# Patient Record
Sex: MALE | Race: WHITE | Employment: FULL TIME | URBAN - METROPOLITAN AREA
[De-identification: names, ages, dates, MRNs, and addresses within clinical notes are randomized per-mention and may not be internally consistent; named-entity substitution may affect disease eponyms.]

---

## 2023-08-16 ENCOUNTER — OFFICE VISIT (OUTPATIENT)
Dept: URGENT CARE | Age: 48
End: 2023-08-16

## 2023-08-16 VITALS
BODY MASS INDEX: 30.06 KG/M2 | OXYGEN SATURATION: 98 % | HEART RATE: 74 BPM | WEIGHT: 210 LBS | SYSTOLIC BLOOD PRESSURE: 130 MMHG | RESPIRATION RATE: 12 BRPM | DIASTOLIC BLOOD PRESSURE: 89 MMHG | HEIGHT: 70 IN | TEMPERATURE: 97.8 F

## 2023-08-16 DIAGNOSIS — L03.116 CELLULITIS OF LEFT LEG: Primary | ICD-10-CM

## 2023-08-16 DIAGNOSIS — L23.9 ALLERGIC CONTACT DERMATITIS, UNSPECIFIED TRIGGER: ICD-10-CM

## 2023-08-16 LAB
ALBUMIN SERPL-MCNC: 4.6 G/DL (ref 3.4–5)
ALBUMIN/GLOB SERPL: 1.8 {RATIO} (ref 1.1–2.2)
ALP SERPL-CCNC: 73 U/L (ref 40–129)
ALT SERPL-CCNC: 28 U/L (ref 10–40)
ANION GAP SERPL CALCULATED.3IONS-SCNC: 10 MMOL/L (ref 3–16)
AST SERPL-CCNC: 28 U/L (ref 15–37)
BILIRUB SERPL-MCNC: 0.4 MG/DL (ref 0–1)
BUN SERPL-MCNC: 14 MG/DL (ref 7–20)
CALCIUM SERPL-MCNC: 9.4 MG/DL (ref 8.3–10.6)
CHLORIDE SERPL-SCNC: 102 MMOL/L (ref 99–110)
CO2 SERPL-SCNC: 26 MMOL/L (ref 21–32)
CREAT SERPL-MCNC: 0.8 MG/DL (ref 0.9–1.3)
GFR SERPLBLD CREATININE-BSD FMLA CKD-EPI: >60 ML/MIN/{1.73_M2}
GLUCOSE SERPL-MCNC: 97 MG/DL (ref 70–99)
POTASSIUM SERPL-SCNC: 4.2 MMOL/L (ref 3.5–5.1)
PROT SERPL-MCNC: 7.1 G/DL (ref 6.4–8.2)
SODIUM SERPL-SCNC: 138 MMOL/L (ref 136–145)

## 2023-08-16 RX ORDER — METHYLPREDNISOLONE 4 MG/1
TABLET ORAL
Qty: 1 KIT | Refills: 0 | Status: SHIPPED | OUTPATIENT
Start: 2023-08-16 | End: 2023-08-22

## 2023-08-16 RX ORDER — SULFAMETHOXAZOLE AND TRIMETHOPRIM 800; 160 MG/1; MG/1
1 TABLET ORAL 2 TIMES DAILY
Qty: 14 TABLET | Refills: 0 | Status: SHIPPED | OUTPATIENT
Start: 2023-08-16 | End: 2023-08-23

## 2023-08-16 RX ORDER — CETIRIZINE HYDROCHLORIDE 10 MG/1
10 TABLET ORAL DAILY
COMMUNITY
Start: 2023-08-16

## 2023-08-16 NOTE — PATIENT INSTRUCTIONS
Complete antibiotics as prescribed. An over the counter probiotic is also recommended. Complete steroids as prescribed  Wound culture, CBC, CMP, and Hgb A1C sent. Will call with results. Cleanse legs with antibiotic soap and avoid irritants  Recommend Zyrtec 10mg daily   Follow up with PCP this week   ER evaluation if symptoms persist or if symptoms worsen. New Prescriptions    CETIRIZINE (ZYRTEC) 10 MG TABLET    Take 1 tablet by mouth daily    METHYLPREDNISOLONE (MEDROL DOSEPACK) 4 MG TABLET    Take by mouth.     SULFAMETHOXAZOLE-TRIMETHOPRIM (BACTRIM DS;SEPTRA DS) 800-160 MG PER TABLET    Take 1 tablet by mouth 2 times daily for 7 days

## 2023-08-16 NOTE — PROGRESS NOTES
Sherly Garcia (:  1975) is a 50 y.o. male,New patient, here for evaluation of the following chief complaint(s): Wound Check (Bilateral lower)      ASSESSMENT/PLAN:    ICD-10-CM    1. Cellulitis of left leg  L03.116 CBC with Auto Differential     Comprehensive Metabolic Panel     Hemoglobin A1C     Culture, Aerobic and Anaerobic     sulfamethoxazole-trimethoprim (BACTRIM DS;SEPTRA DS) 800-160 MG per tablet      2. Allergic contact dermatitis, unspecified trigger  L23.9 methylPREDNISolone (MEDROL DOSEPACK) 4 MG tablet     cetirizine (ZYRTEC) 10 MG tablet          Complete antibiotics as prescribed. An over the counter probiotic is also recommended. Complete steroids as prescribed  Wound culture, CBC, CMP, and Hgb A1C sent. Will call with results. Cleanse legs with antibacterial soap and avoid irritants  Recommend Zyrtec 10mg daily   Follow up with PCP this week   ER evaluation if symptoms persist or if symptoms worsen. Patient presents with lesions to both legs ongoing for two months. He has one open wound to the left lower shin with serosang drainage that was cultured. He had multiple other scabbed lesions. Labs were also sent due to his concern for diabetes causing lesions and no recent labs with primary care. Discussed multiple differential dx of contact versus allergic dermatitis, cellulitis, and venous ulcers (unlikely) and He was treated with Bactrim and a Medrol Dosepak. Follow up with PCP this week was recommended due to unknown origin of lesions. Patient declined dermatology referral.     SUBJECTIVE/OBJECTIVE:    History provided by:  Patient   used: No    HPI:   50 y.o. male presents with symptoms of open wounds to both lower legs ongoing for two months. He felt they were non healing bug bites but now some areas have pus draining, itching, and burning. Areas start as an itch, develop red bump, then has pus draining. All areas are below the knees. Denies fever.  Denies

## 2023-08-17 ENCOUNTER — TELEPHONE (OUTPATIENT)
Dept: URGENT CARE | Age: 48
End: 2023-08-17

## 2023-08-17 LAB
BASOPHILS # BLD: 0 K/UL (ref 0–0.2)
BASOPHILS NFR BLD: 0 %
DEPRECATED RDW RBC AUTO: 14.2 % (ref 12.4–15.4)
EOSINOPHIL # BLD: 0.1 K/UL (ref 0–0.6)
EOSINOPHIL NFR BLD: 1 %
EST. AVERAGE GLUCOSE BLD GHB EST-MCNC: 102.5 MG/DL
HBA1C MFR BLD: 5.2 %
HCT VFR BLD AUTO: 45.5 % (ref 40.5–52.5)
HGB BLD-MCNC: 15 G/DL (ref 13.5–17.5)
LYMPHOCYTES # BLD: 3.1 K/UL (ref 1–5.1)
LYMPHOCYTES NFR BLD: 35 %
MCH RBC QN AUTO: 32.8 PG (ref 26–34)
MCHC RBC AUTO-ENTMCNC: 33 G/DL (ref 31–36)
MCV RBC AUTO: 99.2 FL (ref 80–100)
MONOCYTES # BLD: 0.3 K/UL (ref 0–1.3)
MONOCYTES NFR BLD: 3 %
MONONUC CELLS NFR BLD MANUAL: 3 %
NEUTROPHILS # BLD: 5.1 K/UL (ref 1.7–7.7)
NEUTROPHILS NFR BLD: 54 %
NEUTS BAND NFR BLD MANUAL: 4 % (ref 0–7)
PATH INTERP BLD-IMP: YES
PLATELET # BLD AUTO: 311 K/UL (ref 135–450)
PMV BLD AUTO: 8.2 FL (ref 5–10.5)
RBC # BLD AUTO: 4.59 M/UL (ref 4.2–5.9)
STOMATOCYTES BLD QL SMEAR: ABNORMAL
WBC # BLD AUTO: 8.8 K/UL (ref 4–11)

## 2023-08-17 NOTE — TELEPHONE ENCOUNTER
CMP, Hgb A1C, and prelim negative wound culture reviewed with patient. All normal. CBC is still pending. Call was placed to [facility] lab and it is processing. The patient was informed he will receive another call after culture is complete and CBC is resulted.

## 2023-08-18 LAB — PATH INTERP BLD-IMP: NORMAL

## 2023-08-19 ENCOUNTER — TELEPHONE (OUTPATIENT)
Dept: URGENT CARE | Age: 48
End: 2023-08-19

## 2023-08-19 DIAGNOSIS — L03.116 CELLULITIS OF LEFT LEG: Primary | ICD-10-CM

## 2023-08-19 LAB
BACTERIA SPEC AEROBE CULT: ABNORMAL
BACTERIA SPEC AEROBE CULT: ABNORMAL
BACTERIA SPEC ANAEROBE CULT: ABNORMAL
GRAM STN SPEC: ABNORMAL
ORGANISM: ABNORMAL
ORGANISM: ABNORMAL

## 2023-08-19 RX ORDER — CIPROFLOXACIN 750 MG/1
750 TABLET, FILM COATED ORAL 2 TIMES DAILY
Qty: 20 TABLET | Refills: 0 | Status: SHIPPED | OUTPATIENT
Start: 2023-08-19 | End: 2023-08-29

## 2024-02-04 ENCOUNTER — APPOINTMENT (OUTPATIENT)
Age: 49
DRG: 446 | End: 2024-02-04
Payer: MEDICAID

## 2024-02-04 ENCOUNTER — HOSPITAL ENCOUNTER (INPATIENT)
Age: 49
LOS: 1 days | Discharge: HOME | DRG: 446 | End: 2024-02-05
Attending: STUDENT IN AN ORGANIZED HEALTH CARE EDUCATION/TRAINING PROGRAM | Admitting: HOSPITALIST
Payer: MEDICAID

## 2024-02-04 DIAGNOSIS — N13.30 HYDROURETERONEPHROSIS: ICD-10-CM

## 2024-02-04 DIAGNOSIS — N20.1 LEFT URETERAL STONE: ICD-10-CM

## 2024-02-04 DIAGNOSIS — N20.0 KIDNEY STONE: Primary | ICD-10-CM

## 2024-02-04 PROBLEM — N23 RENAL COLIC: Status: ACTIVE | Noted: 2024-02-04

## 2024-02-04 LAB
ALBUMIN SERPL-MCNC: 4.5 G/DL (ref 3.4–5)
ALBUMIN/GLOB SERPL: 1.5 {RATIO}
ALP SERPL-CCNC: 82 U/L (ref 40–129)
ALT SERPL-CCNC: 37 U/L (ref 10–40)
ANION GAP SERPL CALCULATED.3IONS-SCNC: 12 MMOL/L (ref 3–16)
AST SERPL-CCNC: 34 U/L (ref 15–37)
BASOPHILS # BLD: 0.06 K/UL (ref 0–0.2)
BASOPHILS NFR BLD: 1 %
BILIRUB DIRECT SERPL-MCNC: <0.2 MG/DL (ref 0–0.3)
BILIRUB INDIRECT SERPL-MCNC: NORMAL MG/DL (ref 0–1)
BILIRUB SERPL-MCNC: 0.7 MG/DL (ref 0–1)
BILIRUB UR QL STRIP: ABNORMAL
BUN SERPL-MCNC: 14 MG/DL (ref 7–20)
CALCIUM SERPL-MCNC: 9.4 MG/DL (ref 8.3–10.6)
CHARACTER UR: ABNORMAL
CHLORIDE SERPL-SCNC: 103 MMOL/L (ref 99–110)
CLARITY UR: CLEAR
CO2 SERPL-SCNC: 24 MMOL/L (ref 21–32)
COLOR UR: YELLOW
CREAT SERPL-MCNC: 1.2 MG/DL (ref 0.7–18)
EOSINOPHIL # BLD: 0.24 K/UL (ref 0–0.6)
EOSINOPHILS RELATIVE PERCENT: 3 %
EPI CELLS #/AREA URNS HPF: ABNORMAL /HPF
ERYTHROCYTE [DISTWIDTH] IN BLOOD BY AUTOMATED COUNT: 12.1 % (ref 12.4–15.4)
GFR SERPL CREATININE-BSD FRML MDRD: >60 ML/MIN/1.73M2
GLUCOSE SERPL-MCNC: 96 MG/DL (ref 70–99)
GLUCOSE UR STRIP-MCNC: NEGATIVE MG/DL
HCT VFR BLD AUTO: 45 % (ref 40.5–52.5)
HGB BLD-MCNC: 15.6 G/DL (ref 13.5–17.5)
HGB UR QL STRIP.AUTO: ABNORMAL
IMM GRANULOCYTES # BLD AUTO: 0.01 K/UL (ref 0–0.5)
IMM GRANULOCYTES NFR BLD: 0 %
KETONES UR STRIP-MCNC: NEGATIVE MG/DL
LEUKOCYTE ESTERASE UR QL STRIP: NEGATIVE
LIPASE SERPL-CCNC: 31 U/L (ref 13–60)
LYMPHOCYTES NFR BLD: 2.29 K/UL (ref 1–5.1)
LYMPHOCYTES RELATIVE PERCENT: 24 %
MCH RBC QN AUTO: 31.8 PG (ref 26–34)
MCHC RBC AUTO-ENTMCNC: 34.7 G/DL (ref 31–36)
MCV RBC AUTO: 91.8 FL (ref 80–100)
MONOCYTES NFR BLD: 0.82 K/UL (ref 0–1.3)
MONOCYTES NFR BLD: 9 %
MUCOUS THREADS URNS QL MICRO: PRESENT
NEUTROPHILS NFR BLD: 65 %
NEUTS SEG NFR BLD: 6.21 K/UL (ref 1.7–7.7)
NITRITE UR QL STRIP: NEGATIVE
PH UR STRIP: 5.5 [PH] (ref 5–8)
PLATELET # BLD AUTO: 304 K/UL (ref 135–450)
PMV BLD AUTO: 9 FL
POTASSIUM SERPL-SCNC: 4.1 MMOL/L (ref 3.5–5.1)
PROT SERPL-MCNC: 7.5 G/DL (ref 6.4–8.2)
PROT UR STRIP-MCNC: ABNORMAL MG/DL
RBC # BLD AUTO: 4.9 M/UL (ref 4.2–5.9)
RBC #/AREA URNS HPF: ABNORMAL /HPF
SODIUM SERPL-SCNC: 139 MMOL/L (ref 136–145)
SP GR UR STRIP: >1.03 (ref 1–1.03)
TROPONIN I SERPL HS-MCNC: 10 NG/L (ref 0–22)
UROBILINOGEN UR STRIP-ACNC: 0.2 EU/DL (ref 0–1)
WBC #/AREA URNS HPF: ABNORMAL /HPF
WBC OTHER # BLD: 9.6 K/UL (ref 4–11)

## 2024-02-04 PROCEDURE — 83690 ASSAY OF LIPASE: CPT

## 2024-02-04 PROCEDURE — 1200000000 HC SEMI PRIVATE

## 2024-02-04 PROCEDURE — 2580000003 HC RX 258: Performed by: STUDENT IN AN ORGANIZED HEALTH CARE EDUCATION/TRAINING PROGRAM

## 2024-02-04 PROCEDURE — 85025 COMPLETE CBC W/AUTO DIFF WBC: CPT

## 2024-02-04 PROCEDURE — 6360000004 HC RX CONTRAST MEDICATION: Performed by: STUDENT IN AN ORGANIZED HEALTH CARE EDUCATION/TRAINING PROGRAM

## 2024-02-04 PROCEDURE — 96375 TX/PRO/DX INJ NEW DRUG ADDON: CPT

## 2024-02-04 PROCEDURE — 6370000000 HC RX 637 (ALT 250 FOR IP): Performed by: HOSPITALIST

## 2024-02-04 PROCEDURE — 84484 ASSAY OF TROPONIN QUANT: CPT

## 2024-02-04 PROCEDURE — 80053 COMPREHEN METABOLIC PANEL: CPT

## 2024-02-04 PROCEDURE — 99285 EMERGENCY DEPT VISIT HI MDM: CPT

## 2024-02-04 PROCEDURE — 82248 BILIRUBIN DIRECT: CPT

## 2024-02-04 PROCEDURE — 6360000002 HC RX W HCPCS: Performed by: HOSPITALIST

## 2024-02-04 PROCEDURE — 74177 CT ABD & PELVIS W/CONTRAST: CPT

## 2024-02-04 PROCEDURE — 93005 ELECTROCARDIOGRAM TRACING: CPT | Performed by: STUDENT IN AN ORGANIZED HEALTH CARE EDUCATION/TRAINING PROGRAM

## 2024-02-04 PROCEDURE — 96374 THER/PROPH/DIAG INJ IV PUSH: CPT

## 2024-02-04 PROCEDURE — 87086 URINE CULTURE/COLONY COUNT: CPT

## 2024-02-04 PROCEDURE — 2580000003 HC RX 258: Performed by: HOSPITALIST

## 2024-02-04 PROCEDURE — 81001 URINALYSIS AUTO W/SCOPE: CPT

## 2024-02-04 PROCEDURE — 6360000002 HC RX W HCPCS: Performed by: STUDENT IN AN ORGANIZED HEALTH CARE EDUCATION/TRAINING PROGRAM

## 2024-02-04 RX ORDER — MORPHINE SULFATE 2 MG/ML
2 INJECTION, SOLUTION INTRAMUSCULAR; INTRAVENOUS EVERY 4 HOURS PRN
Status: DISCONTINUED | OUTPATIENT
Start: 2024-02-04 | End: 2024-02-05 | Stop reason: HOSPADM

## 2024-02-04 RX ORDER — SODIUM CHLORIDE 9 MG/ML
INJECTION, SOLUTION INTRAVENOUS CONTINUOUS
Status: DISCONTINUED | OUTPATIENT
Start: 2024-02-04 | End: 2024-02-05 | Stop reason: HOSPADM

## 2024-02-04 RX ORDER — MORPHINE SULFATE 4 MG/ML
4 INJECTION, SOLUTION INTRAMUSCULAR; INTRAVENOUS ONCE
Status: COMPLETED | OUTPATIENT
Start: 2024-02-04 | End: 2024-02-04

## 2024-02-04 RX ORDER — ACETAMINOPHEN 325 MG/1
650 TABLET ORAL EVERY 4 HOURS PRN
Status: DISCONTINUED | OUTPATIENT
Start: 2024-02-04 | End: 2024-02-05 | Stop reason: HOSPADM

## 2024-02-04 RX ORDER — OXYCODONE HYDROCHLORIDE 5 MG/1
5 TABLET ORAL EVERY 4 HOURS PRN
Status: DISCONTINUED | OUTPATIENT
Start: 2024-02-04 | End: 2024-02-05 | Stop reason: HOSPADM

## 2024-02-04 RX ORDER — KETOROLAC TROMETHAMINE 15 MG/ML
15 INJECTION, SOLUTION INTRAMUSCULAR; INTRAVENOUS ONCE
Status: COMPLETED | OUTPATIENT
Start: 2024-02-04 | End: 2024-02-04

## 2024-02-04 RX ORDER — ONDANSETRON 2 MG/ML
4 INJECTION INTRAMUSCULAR; INTRAVENOUS ONCE
Status: COMPLETED | OUTPATIENT
Start: 2024-02-04 | End: 2024-02-04

## 2024-02-04 RX ORDER — ONDANSETRON 4 MG/1
4 TABLET, ORALLY DISINTEGRATING ORAL ONCE
Status: DISCONTINUED | OUTPATIENT
Start: 2024-02-04 | End: 2024-02-05 | Stop reason: HOSPADM

## 2024-02-04 RX ORDER — SENNA AND DOCUSATE SODIUM 50; 8.6 MG/1; MG/1
1 TABLET, FILM COATED ORAL 2 TIMES DAILY
Status: DISCONTINUED | OUTPATIENT
Start: 2024-02-04 | End: 2024-02-05 | Stop reason: HOSPADM

## 2024-02-04 RX ADMIN — SODIUM CHLORIDE: 9 INJECTION, SOLUTION INTRAVENOUS at 11:03

## 2024-02-04 RX ADMIN — WATER 1000 MG: 1 INJECTION INTRAMUSCULAR; INTRAVENOUS; SUBCUTANEOUS at 09:50

## 2024-02-04 RX ADMIN — ONDANSETRON 4 MG: 2 INJECTION INTRAMUSCULAR; INTRAVENOUS at 08:13

## 2024-02-04 RX ADMIN — MORPHINE SULFATE 4 MG: 4 INJECTION, SOLUTION INTRAMUSCULAR; INTRAVENOUS at 08:13

## 2024-02-04 RX ADMIN — CEFTRIAXONE SODIUM 2000 MG: 2 INJECTION, POWDER, FOR SOLUTION INTRAMUSCULAR; INTRAVENOUS at 21:00

## 2024-02-04 RX ADMIN — IOPAMIDOL 75 ML: 755 INJECTION, SOLUTION INTRAVENOUS at 08:43

## 2024-02-04 RX ADMIN — KETOROLAC TROMETHAMINE 15 MG: 15 INJECTION, SOLUTION INTRAMUSCULAR; INTRAVENOUS at 08:59

## 2024-02-04 RX ADMIN — SENNOSIDES AND DOCUSATE SODIUM 1 TABLET: 50; 8.6 TABLET ORAL at 21:01

## 2024-02-04 ASSESSMENT — PAIN DESCRIPTION - PAIN TYPE
TYPE: ACUTE PAIN

## 2024-02-04 ASSESSMENT — PAIN DESCRIPTION - LOCATION
LOCATION: FLANK

## 2024-02-04 ASSESSMENT — PAIN DESCRIPTION - ORIENTATION
ORIENTATION: LEFT

## 2024-02-04 ASSESSMENT — PAIN DESCRIPTION - FREQUENCY
FREQUENCY: CONTINUOUS

## 2024-02-04 ASSESSMENT — PAIN - FUNCTIONAL ASSESSMENT
PAIN_FUNCTIONAL_ASSESSMENT: ACTIVITIES ARE NOT PREVENTED

## 2024-02-04 ASSESSMENT — PAIN SCALES - GENERAL
PAINLEVEL_OUTOF10: 2
PAINLEVEL_OUTOF10: 8
PAINLEVEL_OUTOF10: 2
PAINLEVEL_OUTOF10: 8
PAINLEVEL_OUTOF10: 2
PAINLEVEL_OUTOF10: 5
PAINLEVEL_OUTOF10: 6
PAINLEVEL_OUTOF10: 2

## 2024-02-04 ASSESSMENT — PAIN DESCRIPTION - DIRECTION
RADIATING_TOWARDS: BACK

## 2024-02-04 ASSESSMENT — PAIN DESCRIPTION - ONSET
ONSET: ON-GOING
ONSET: ON-GOING

## 2024-02-04 ASSESSMENT — PAIN DESCRIPTION - DESCRIPTORS
DESCRIPTORS: CRAMPING;ACHING
DESCRIPTORS: CRAMPING;ACHING
DESCRIPTORS: DULL;ACHING;SQUEEZING
DESCRIPTORS: DULL;ACHING

## 2024-02-04 NOTE — ED PROVIDER NOTES
questionable send plan  Hospitalist for admission    Shared Decision-Making was performed and disposition discussed with the patient and their questions were answered     Social determinants of health impacting treatment or disposition:  patient is a smoker    We discussed  appropriate Smoking/Vaping/Marijuana cessation for 5 minutes   Advised patient to quit and offered support.  Educational material provided to patient.        Code Status:    Not addressed at this visit      Vitals Reviewed:    Vitals:    02/04/24 0800 02/04/24 0830 02/04/24 0859 02/04/24 0900   BP: (!) 152/96 (!) 131/110     Pulse: 80 84 75 70   Resp: 18 (!) 8 16 18   Temp:       SpO2: 100% 96% 94% 97%   Weight:       Height:           The patient was seen and examined. Appropriate diagnostic testing was performed and results reviewed with the patient.    The results of pertinent diagnostic studies and exam findings were discussed. The patient’s provisional diagnosis and plan of care were discussed with the patient  who expressed a complete understanding. Any medications were reviewed and indications and risks of medications were discussed with the patient.     Strict verbal and written return precautions, instructions and appropriate follow-up were provided as well..     ED Medications administered this visit:  (None if blank)  Medications   cefTRIAXone (ROCEPHIN) 1,000 mg in sterile water 10 mL IV syringe (has no administration in time range)   morphine injection 4 mg (4 mg IntraVENous Given 2/4/24 0813)   ondansetron (ZOFRAN) injection 4 mg (4 mg IntraVENous Given 2/4/24 0813)   iopamidol (ISOVUE-370) 76 % injection 75 mL (75 mLs IntraVENous Given 2/4/24 0843)   ketorolac (TORADOL) injection 15 mg (15 mg IntraVENous Given 2/4/24 0859)         PROCEDURES: (None if blank)  Procedures:       CRITICAL CARE: (None if blank)        DISCHARGE PRESCRIPTIONS: (None if blank)  New Prescriptions    No medications on file         I am the primary

## 2024-02-04 NOTE — H&P
History and Physical      Name:  Ángel Rod /Age/Sex: 1975  (48 y.o. male)   MRN & CSN:  [id number] & [id number] Encounter Date/Time: 2024   Location:   PCP: No primary care provider on file.       Hospital Day: 1    Assessment and Plan:   Ángel Rod is a 48 y.o. male with medical history including hyperlipidemia presented emergency room with complaint of left flank and left groin pain.  Acute left renal colic pain due to #2  Nephrolithiasis with obstructive uropathy: 5 mm x 3 mm obstructing calculus at L ureterovesicular junction with left hydronephrosis  Abnormal UA  Intermittent dizziness/vertigo with movement of head for last 2 months, unclear etiology, concern for BPV, rule out central etiology  Hx of HLD - not on meds    Plan:  Admission status - inpatient, Expect length of stay > 2 midnights  IV hydration, pain control and supportive care  Strain all urine for possible spontaneous passage of stone  Urology team was consulted by ER team, who will see patient in consultation and determine further need for cystoscopy and stent placement.  Empirical antibiotics per urology team.  Obtain MRI brain for evaluation of dizziness  Resume home meds as ordered   See orders  Personally reviewed Lab Studies and Imaging results ordered by ER.  Discussed management of the case with ER team who recommended admission under Hospitalist service      Diet Diet NPO  ADULT DIET; Regular   DVT Prophylaxis [x] Lovenox, []  Heparin, [] SCDs, [] Ambulation,  [] Eliquis, [] Xarelto, [] Coumadin   Code Status No Order     Disposition Plan:   Current Living situation: Home  Expected Disposition:  1-2 days  Estimated D/C:  vs [facility]    History from:     Patient    History of Present Illness:     Chief Complaint/Reason for admission: Left renal colic    Ángel Rod is a 48 y.o. male with medical history including hyperlipidemia presented emergency room with complaint of left flank and left groin pain.  Patient reports

## 2024-02-04 NOTE — DISCHARGE INSTRUCTIONS
UROLOGY  Remove the stent with string attached Thursday   See my PA Laura Carmichael in 6 weeks for stone prevention discussion      Advised follow up as outpt.  If you have any problems after going home please call your family doctor or go to the nearest emergency room.  Call PCP or go to nearest ER if you develop any of following symptoms or any worsening symptoms. Fever, chills, chest pain, cough, hemoptysis, shortness of breath, wheezing, nausea, vomiting, abdominal pain, diarrhea, Headach, Dizziness or any tingling or numbness in extremities.

## 2024-02-04 NOTE — ED NOTES
ED TO INPATIENT SBAR HANDOFF    Patient Name: Ángel Rod   Preferred Name: Ángel  : 1975  48 y.o.   Family/Caregiver Present: no   Code Status Order: No Order  PO Status:  NPO past midnight  Telemetry Order:   C-SSRS: Risk of Suicide: No Risk  Sitter no   Restraints: no  Sepsis Risk Score Sepsis Risk Score: 0.71    Situation  Chief Complaint   Patient presents with    Dizziness    Flank Pain     Brief Description of Patient's Condition: Patient being admitted for possible lithotripsy tomorrow if he doesn't pass his stone today.   Mental Status: oriented  Arrived from:Home  Imaging:   CT ABDOMEN PELVIS W IV CONTRAST Additional Contrast? None   Final Result      5 x 3 mm obstructing calculus at the left ureterovesicular junction with   upstream hydroureteronephrosis. There is also altered enhancement in the left   kidney, which is mildly enlarged.        Abnormal labs:   Abnormal Labs Reviewed   CBC WITH AUTO DIFFERENTIAL - Abnormal; Notable for the following components:       Result Value    RDW 12.1 (*)     All other components within normal limits   URINALYSIS WITH REFLEX TO CULTURE - Abnormal; Notable for the following components:    Bilirubin Urine SMALL (*)     Specific Gravity, UA >1.030 (*)     Urine Hgb SMALL (*)     Protein, UA TRACE (*)     All other components within normal limits   MICROSCOPIC URINALYSIS - Abnormal; Notable for the following components:    Mucus, UA PRESENT (*)     Other Observations UA Culture not indicated. (*)     All other components within normal limits       Background  Allergies: No Known Allergies  History:   Past Medical History:   Diagnosis Date    Hyperlipidemia        Assessment  Vitals:        Vitals:    24 0800 24 0830 24 0859 24 0900   BP: (!) 152/96 (!) 131/110     Pulse: 80 84 75 70   Resp: 18 (!) 8 16 18   Temp:       SpO2: 100% 96% 94% 97%   Weight:       Height:         Deterioration Index (DI): Deterioration Index:

## 2024-02-04 NOTE — ED TRIAGE NOTES
Patient presents to ED complaining of dizziness times one month. States left side flank pain x 2.5 weeks that is radiating into rectum, front of pelvis, and testicles.

## 2024-02-05 ENCOUNTER — APPOINTMENT (OUTPATIENT)
Age: 49
DRG: 446 | End: 2024-02-05
Payer: MEDICAID

## 2024-02-05 ENCOUNTER — ANESTHESIA EVENT (OUTPATIENT)
Age: 49
DRG: 446 | End: 2024-02-05
Payer: MEDICAID

## 2024-02-05 ENCOUNTER — ANESTHESIA (OUTPATIENT)
Age: 49
DRG: 446 | End: 2024-02-05
Payer: MEDICAID

## 2024-02-05 VITALS
RESPIRATION RATE: 16 BRPM | SYSTOLIC BLOOD PRESSURE: 134 MMHG | BODY MASS INDEX: 31.36 KG/M2 | WEIGHT: 219.03 LBS | DIASTOLIC BLOOD PRESSURE: 92 MMHG | TEMPERATURE: 97.7 F | HEIGHT: 70 IN | OXYGEN SATURATION: 99 % | HEART RATE: 58 BPM

## 2024-02-05 LAB
EKG ATRIAL RATE: 76 BPM
EKG DIAGNOSIS: NORMAL
EKG P AXIS: 71 DEGREES
EKG P-R INTERVAL: 128 MS
EKG Q-T INTERVAL: 384 MS
EKG QRS DURATION: 104 MS
EKG QTC CALCULATION (BAZETT): 432 MS
EKG R AXIS: 43 DEGREES
EKG T AXIS: 20 DEGREES
EKG VENTRICULAR RATE: 76 BPM
MICROORGANISM SPEC CULT: NO GROWTH
SPECIMEN DESCRIPTION: NORMAL

## 2024-02-05 PROCEDURE — 0T778DZ DILATION OF LEFT URETER WITH INTRALUMINAL DEVICE, VIA NATURAL OR ARTIFICIAL OPENING ENDOSCOPIC: ICD-10-PCS | Performed by: UROLOGY

## 2024-02-05 PROCEDURE — 2709999900 HC NON-CHARGEABLE SUPPLY: Performed by: UROLOGY

## 2024-02-05 PROCEDURE — BT1F1ZZ FLUOROSCOPY OF LEFT KIDNEY, URETER AND BLADDER USING LOW OSMOLAR CONTRAST: ICD-10-PCS | Performed by: UROLOGY

## 2024-02-05 PROCEDURE — C1758 CATHETER, URETERAL: HCPCS | Performed by: UROLOGY

## 2024-02-05 PROCEDURE — 74420 UROGRAPHY RTRGR +-KUB: CPT

## 2024-02-05 PROCEDURE — 2580000003 HC RX 258: Performed by: NURSE ANESTHETIST, CERTIFIED REGISTERED

## 2024-02-05 PROCEDURE — 6360000004 HC RX CONTRAST MEDICATION: Performed by: UROLOGY

## 2024-02-05 PROCEDURE — 7100000000 HC PACU RECOVERY - FIRST 15 MIN: Performed by: UROLOGY

## 2024-02-05 PROCEDURE — 70551 MRI BRAIN STEM W/O DYE: CPT

## 2024-02-05 PROCEDURE — C2617 STENT, NON-COR, TEM W/O DEL: HCPCS | Performed by: UROLOGY

## 2024-02-05 PROCEDURE — 0TC78ZZ EXTIRPATION OF MATTER FROM LEFT URETER, VIA NATURAL OR ARTIFICIAL OPENING ENDOSCOPIC: ICD-10-PCS | Performed by: UROLOGY

## 2024-02-05 PROCEDURE — 2500000003 HC RX 250 WO HCPCS: Performed by: NURSE ANESTHETIST, CERTIFIED REGISTERED

## 2024-02-05 PROCEDURE — 2580000003 HC RX 258: Performed by: UROLOGY

## 2024-02-05 PROCEDURE — C1769 GUIDE WIRE: HCPCS | Performed by: UROLOGY

## 2024-02-05 PROCEDURE — 7100000001 HC PACU RECOVERY - ADDTL 15 MIN: Performed by: UROLOGY

## 2024-02-05 PROCEDURE — 3700000000 HC ANESTHESIA ATTENDED CARE: Performed by: UROLOGY

## 2024-02-05 PROCEDURE — 6360000002 HC RX W HCPCS: Performed by: NURSE ANESTHETIST, CERTIFIED REGISTERED

## 2024-02-05 PROCEDURE — 2580000003 HC RX 258: Performed by: HOSPITALIST

## 2024-02-05 PROCEDURE — 6360000002 HC RX W HCPCS: Performed by: HOSPITALIST

## 2024-02-05 PROCEDURE — 3600000004 HC SURGERY LEVEL 4 BASE: Performed by: UROLOGY

## 2024-02-05 PROCEDURE — 3600000014 HC SURGERY LEVEL 4 ADDTL 15MIN: Performed by: UROLOGY

## 2024-02-05 PROCEDURE — 88300 SURGICAL PATH GROSS: CPT

## 2024-02-05 PROCEDURE — 3700000001 HC ADD 15 MINUTES (ANESTHESIA): Performed by: UROLOGY

## 2024-02-05 PROCEDURE — 2720000010 HC SURG SUPPLY STERILE: Performed by: UROLOGY

## 2024-02-05 DEVICE — URETERAL STENT
Type: IMPLANTABLE DEVICE | Site: URETER | Status: FUNCTIONAL
Brand: PERCUFLEX™ PLUS

## 2024-02-05 RX ORDER — MEPERIDINE HYDROCHLORIDE 25 MG/ML
12.5 INJECTION INTRAMUSCULAR; INTRAVENOUS; SUBCUTANEOUS EVERY 5 MIN PRN
Status: DISCONTINUED | OUTPATIENT
Start: 2024-02-05 | End: 2024-02-05 | Stop reason: HOSPADM

## 2024-02-05 RX ORDER — FENTANYL CITRATE 50 UG/ML
25 INJECTION, SOLUTION INTRAMUSCULAR; INTRAVENOUS EVERY 5 MIN PRN
Status: DISCONTINUED | OUTPATIENT
Start: 2024-02-05 | End: 2024-02-05 | Stop reason: HOSPADM

## 2024-02-05 RX ORDER — LIDOCAINE HYDROCHLORIDE 20 MG/ML
INJECTION, SOLUTION INTRAVENOUS PRN
Status: DISCONTINUED | OUTPATIENT
Start: 2024-02-05 | End: 2024-02-05 | Stop reason: SDUPTHER

## 2024-02-05 RX ORDER — DEXAMETHASONE SODIUM PHOSPHATE 4 MG/ML
INJECTION, SOLUTION INTRA-ARTICULAR; INTRALESIONAL; INTRAMUSCULAR; INTRAVENOUS; SOFT TISSUE PRN
Status: DISCONTINUED | OUTPATIENT
Start: 2024-02-05 | End: 2024-02-05 | Stop reason: SDUPTHER

## 2024-02-05 RX ORDER — FENTANYL CITRATE 50 UG/ML
INJECTION, SOLUTION INTRAMUSCULAR; INTRAVENOUS PRN
Status: DISCONTINUED | OUTPATIENT
Start: 2024-02-05 | End: 2024-02-05 | Stop reason: SDUPTHER

## 2024-02-05 RX ORDER — SUCCINYLCHOLINE/SOD CL,ISO/PF 100 MG/5ML
SYRINGE (ML) INTRAVENOUS PRN
Status: DISCONTINUED | OUTPATIENT
Start: 2024-02-05 | End: 2024-02-05 | Stop reason: SDUPTHER

## 2024-02-05 RX ORDER — PROPOFOL 10 MG/ML
INJECTION, EMULSION INTRAVENOUS PRN
Status: DISCONTINUED | OUTPATIENT
Start: 2024-02-05 | End: 2024-02-05 | Stop reason: SDUPTHER

## 2024-02-05 RX ORDER — SODIUM CHLORIDE 9 MG/ML
INJECTION, SOLUTION INTRAVENOUS CONTINUOUS PRN
Status: DISCONTINUED | OUTPATIENT
Start: 2024-02-05 | End: 2024-02-05 | Stop reason: SDUPTHER

## 2024-02-05 RX ORDER — SODIUM CHLORIDE 0.9 % (FLUSH) 0.9 %
5-40 SYRINGE (ML) INJECTION PRN
Status: DISCONTINUED | OUTPATIENT
Start: 2024-02-05 | End: 2024-02-05 | Stop reason: HOSPADM

## 2024-02-05 RX ORDER — SODIUM CHLORIDE 0.9 % (FLUSH) 0.9 %
5-40 SYRINGE (ML) INJECTION EVERY 12 HOURS SCHEDULED
Status: DISCONTINUED | OUTPATIENT
Start: 2024-02-05 | End: 2024-02-05 | Stop reason: HOSPADM

## 2024-02-05 RX ORDER — MAGNESIUM HYDROXIDE 1200 MG/15ML
LIQUID ORAL PRN
Status: DISCONTINUED | OUTPATIENT
Start: 2024-02-05 | End: 2024-02-05 | Stop reason: ALTCHOICE

## 2024-02-05 RX ORDER — ONDANSETRON 2 MG/ML
4 INJECTION INTRAMUSCULAR; INTRAVENOUS
Status: DISCONTINUED | OUTPATIENT
Start: 2024-02-05 | End: 2024-02-05 | Stop reason: HOSPADM

## 2024-02-05 RX ORDER — FENTANYL CITRATE 50 UG/ML
50 INJECTION, SOLUTION INTRAMUSCULAR; INTRAVENOUS EVERY 5 MIN PRN
Status: DISCONTINUED | OUTPATIENT
Start: 2024-02-05 | End: 2024-02-05 | Stop reason: HOSPADM

## 2024-02-05 RX ORDER — ONDANSETRON 2 MG/ML
INJECTION INTRAMUSCULAR; INTRAVENOUS PRN
Status: DISCONTINUED | OUTPATIENT
Start: 2024-02-05 | End: 2024-02-05 | Stop reason: SDUPTHER

## 2024-02-05 RX ORDER — SODIUM CHLORIDE 9 MG/ML
INJECTION, SOLUTION INTRAVENOUS PRN
Status: DISCONTINUED | OUTPATIENT
Start: 2024-02-05 | End: 2024-02-05 | Stop reason: HOSPADM

## 2024-02-05 RX ORDER — ROCURONIUM BROMIDE 10 MG/ML
INJECTION, SOLUTION INTRAVENOUS PRN
Status: DISCONTINUED | OUTPATIENT
Start: 2024-02-05 | End: 2024-02-05 | Stop reason: SDUPTHER

## 2024-02-05 RX ORDER — MIDAZOLAM HYDROCHLORIDE 1 MG/ML
INJECTION INTRAMUSCULAR; INTRAVENOUS PRN
Status: DISCONTINUED | OUTPATIENT
Start: 2024-02-05 | End: 2024-02-05 | Stop reason: SDUPTHER

## 2024-02-05 RX ADMIN — ROCURONIUM BROMIDE 30 MG: 10 INJECTION, SOLUTION INTRAVENOUS at 10:40

## 2024-02-05 RX ADMIN — SUGAMMADEX 200 MG: 100 INJECTION, SOLUTION INTRAVENOUS at 11:12

## 2024-02-05 RX ADMIN — SODIUM CHLORIDE: 9 INJECTION, SOLUTION INTRAVENOUS at 10:28

## 2024-02-05 RX ADMIN — FENTANYL CITRATE 50 MCG: 50 INJECTION, SOLUTION INTRAMUSCULAR; INTRAVENOUS at 11:05

## 2024-02-05 RX ADMIN — MORPHINE SULFATE 2 MG: 2 INJECTION, SOLUTION INTRAMUSCULAR; INTRAVENOUS at 11:45

## 2024-02-05 RX ADMIN — FENTANYL CITRATE 100 MCG: 50 INJECTION, SOLUTION INTRAMUSCULAR; INTRAVENOUS at 10:34

## 2024-02-05 RX ADMIN — MIDAZOLAM HYDROCHLORIDE 2 MG: 1 INJECTION, SOLUTION INTRAMUSCULAR; INTRAVENOUS at 10:29

## 2024-02-05 RX ADMIN — SODIUM CHLORIDE: 9 INJECTION, SOLUTION INTRAVENOUS at 07:07

## 2024-02-05 RX ADMIN — ROCURONIUM BROMIDE 10 MG: 10 INJECTION, SOLUTION INTRAVENOUS at 10:35

## 2024-02-05 RX ADMIN — PROPOFOL 200 MG: 10 INJECTION, EMULSION INTRAVENOUS at 10:35

## 2024-02-05 RX ADMIN — LIDOCAINE HYDROCHLORIDE 100 MG: 20 INJECTION, SOLUTION INTRAVENOUS at 10:34

## 2024-02-05 RX ADMIN — ONDANSETRON 4 MG: 2 INJECTION INTRAMUSCULAR; INTRAVENOUS at 10:44

## 2024-02-05 RX ADMIN — DEXAMETHASONE SODIUM PHOSPHATE 8 MG: 4 INJECTION, SOLUTION INTRA-ARTICULAR; INTRALESIONAL; INTRAMUSCULAR; INTRAVENOUS; SOFT TISSUE at 10:44

## 2024-02-05 RX ADMIN — Medication 100 MG: at 10:36

## 2024-02-05 RX ADMIN — FENTANYL CITRATE 50 MCG: 50 INJECTION, SOLUTION INTRAMUSCULAR; INTRAVENOUS at 11:09

## 2024-02-05 ASSESSMENT — PAIN DESCRIPTION - LOCATION
LOCATION: PENIS

## 2024-02-05 ASSESSMENT — PAIN SCALES - GENERAL
PAINLEVEL_OUTOF10: 0
PAINLEVEL_OUTOF10: 7
PAINLEVEL_OUTOF10: 0
PAINLEVEL_OUTOF10: 2
PAINLEVEL_OUTOF10: 4
PAINLEVEL_OUTOF10: 0

## 2024-02-05 ASSESSMENT — PAIN DESCRIPTION - DESCRIPTORS
DESCRIPTORS: DISCOMFORT
DESCRIPTORS: ACHING;DISCOMFORT
DESCRIPTORS: DISCOMFORT

## 2024-02-05 ASSESSMENT — PAIN DESCRIPTION - PAIN TYPE
TYPE: SURGICAL PAIN

## 2024-02-05 ASSESSMENT — PAIN DESCRIPTION - FREQUENCY: FREQUENCY: CONTINUOUS

## 2024-02-05 NOTE — CONSULTS
week     Comment: about a beer a day    Drug use: Never         Review of Systems: A 12 point ROS was performed and was unremarkable unless listed in the history of present illness.      I/O last 3 completed shifts:  In: 720 [P.O.:720]  Out: 625 [Urine:625]    Physical Exam:  Patient Vitals for the past 8 hrs:   BP Temp Temp src Pulse Resp SpO2   02/05/24 0948 125/88 98.7 °F (37.1 °C) Infrared 55 16 100 %   02/05/24 0704 (!) 130/95 98.1 °F (36.7 °C) Oral 69 18 100 %   02/05/24 0439 125/82 97.7 °F (36.5 °C) Oral 60 16 97 %     Constitutional: pleasant patient in NAD, with a normal body habitus, in pain   EENT: pink conjunctivae, lips without cyanosis, normal appearance of ears and nose  Neck: no masses or lesions, trachea midline   Respiratory: normal respiratory movements without distress   Cardiovascular: regular rate and rhythm, lower extremities without edema   Abdomen: soft, NTND, no masses, no guarding  Back: left CVAT, no abnormal curvature of the spine  Lymphatic: no palpable cervical or supraclavicular lymphadenopathy  Skin: warm and dry, no rashes, lesions, or ulcers  Musculoskeletal: normal ROM, m. tone, no digital cyanosis, head normocephalic  Psych: normal mood and affect, alert and appropriately answers questions  : stable bladder, no urethral catheter    Labs:     No results found for: \"PSA\"  No results found for: \"PSA\", \"PSADIA\"  Recent Labs     02/04/24  0817   WBC 9.6   HGB 15.6   HCT 45.0   MCV 91.8        Lab Results   Component Value Date    LABA1C 5.2 08/16/2023     Lab Results   Component Value Date    CREATININE 1.2 02/04/2024     Lab Results   Component Value Date     02/04/2024    K 4.1 02/04/2024     02/04/2024    CO2 24 02/04/2024    BUN 14 02/04/2024    CREATININE 1.2 02/04/2024    GLUCOSE 96 02/04/2024    CALCIUM 9.4 02/04/2024    PROT 7.5 02/04/2024    LABALBU 4.5 02/04/2024    BILITOT 0.7 02/04/2024    ALKPHOS 82 02/04/2024    AST 34 02/04/2024    ALT 37 02/04/2024

## 2024-02-05 NOTE — FLOWSHEET NOTE
02/05/24 0704   Vital Signs   Temp 98.1 °F (36.7 °C)   Temp Source Oral   Pulse 69   Heart Rate Source Monitor   Respirations 18   BP (!) 130/95   MAP (Calculated) 107   MAP (mmHg) 103   BP Location Right upper arm   Patient Position Sitting   Pain Assessment   Pain Assessment 0-10   Pain Level 0   Oxygen Therapy   SpO2 100 %   O2 Device None (Room air)   O2 Flow Rate (L/min) 0 L/min       Pt assessment complete; see flow sheets. Vitals completed. No s/s of distress. Pt NPO for urology consult regarding kidney stone. PO meds held this morning. MRI screening completed with night shift RN. Goal for MRI this morning. Awaiting cysto with urology this morning. Morning rounds completed with Dr. Javier. Pt stable.    Any Dubois RN

## 2024-02-05 NOTE — CARE COORDINATION
Case Management Assessment  Initial Evaluation    Date/Time of Evaluation: 2/5/2024 10:35 AM  Assessment Completed by: WAQAR Land    If patient is discharged prior to next notation, then this note serves as note for discharge by case management.    Patient Name: Ángel Rod                   YOB: 1975  Diagnosis: Renal colic [N23]  Kidney stone [N20.0]  Hydroureteronephrosis [N13.30]                   Date / Time: 2/4/2024  7:51 AM    Patient Admission Status: Inpatient   Readmission Risk (Low < 19, Mod (19-27), High > 27): Readmission Risk Score: 3.2    Current PCP: No primary care provider on file.  PCP verified by CM? No    Chart Reviewed: Yes      History Provided by: Patient  Patient Orientation: Alert and Oriented    Patient Cognition: Alert    Hospitalization in the last 30 days (Readmission):  No    If yes, Readmission Assessment in CM Navigator will be completed.    Advance Directives:      Code Status: Full Code   Patient's Primary Decision Maker is: Legal Next of Kin      Discharge Planning:    Patient lives with: Spouse/Significant Other Type of Home: House  Primary Care Giver: Self  Patient Support Systems include: Spouse/Significant Other   Current Financial resources: Medicare  Current community resources: None  Current services prior to admission: None            Current DME:              Type of Home Care services:  None    ADLS  Prior functional level: Independent in ADLs/IADLs  Current functional level: Independent in ADLs/IADLs    PT AM-PAC:   /24  OT AM-PAC:   /24    Family can provide assistance at DC: Yes  Would you like Case Management to discuss the discharge plan with any other family members/significant others, and if so, who? No  Plans to Return to Present Housing: Yes  Other Identified Issues/Barriers to RETURNING to current housing: Yes  Potential Assistance needed at discharge: N/A            Potential DME:    Patient expects to discharge to: House  Plan for

## 2024-02-05 NOTE — CARE COORDINATION
DISCHARGE ORDER  Date/Time 2024 2:09 PM  Completed by: WAQAR Land, Case Management    Patient Name: Ángel Rod      : 1975  Admitting Diagnosis: Renal colic [N23]  Kidney stone [N20.0]  Hydroureteronephrosis [N13.30]      Admit order Date and Status:2024  (verify MD's last order for status of admission)          Date of Last IMM Given: ***    Reviewed chart.  Role of discharge planner explained and patient verbalized understanding. Discharge order is noted.    Has Home O2 in place on admit:  No  Informed of need to bring portable home O2 tank on day of discharge for nursing to connect prior to leaving:   No  Verbalized agreement/Understanding:   {Responses; yes/no/not indicated:54989}  Pt is being d/c'd to home today. Pt's O2 sats are 99% on RA.    Discharge timeout done with CM, PT , RN. All discharge needs and concerns addressed.

## 2024-02-05 NOTE — PROGRESS NOTES
4 Eyes Skin Assessment     NAME:  Ángel Rod  YOB: 1975  MEDICAL RECORD NUMBER:  [id number]    The patient is being assessed for  Admission    I agree that at least one RN has performed a thorough Head to Toe Skin Assessment on the patient. ALL assessment sites listed below have been assessed.      Areas assessed by both nurses:    Head, Face, Ears, Shoulders, Back, Chest, Arms, Elbows, Hands, Sacrum. Buttock, Coccyx, Ischium, and Legs. Feet and Heels        Does the Patient have a Wound? No noted wound(s)       Amador Prevention initiated by RN: No  Wound Care Orders initiated by RN: No    Pressure Injury (Stage 3,4, Unstageable, DTI, NWPT, and Complex wounds) if present, place Wound referral order by RN under : No    New Ostomies, if present place, Ostomy referral order under : No     Nurse 1 eSignature: Electronically signed by Charly Benitez RN on 2/4/24 at 12:00 PM EST    **SHARE this note so that the co-signing nurse can place an eSignature**    Nurse 2 eSignature: Electronically signed by Roberta Rashid RN on 2/4/24 at 12:16 PM EST   
Assessment complete. VSS. Patient resting in bed. Respirations even and easy. Call light in reach. Fall precautions in place. No needs expressed at this time. MRI screening completed with patient. Pt reports his pain is to his left flank 2/10 and is bearable at this time, denies needs for pain medication. Pt is aware NPO at midnight. Pt having sips of apple juice and is A and O x 4. IVF running as ordered.   
Endo updated on report for patient. Pt placed in gown. CHG wipes/bath completed. Pt o/a. No s/s of distress.    Any Dubois RN    
PACU Transfer to Floor Note    Procedure(s):  CYSTOSCOPY LEFT STENT INSERTION  URETEROSCOPY WITH X-RAY AND LASER    Current Allergies: Patient has no known allergies.    Pt meets criteria as per Rod Score and ASPAN Standards to transfer to next phase of care.     No results for input(s): \"POCGLU\" in the last 72 hours.    Vitals:    02/05/24 1200   BP: (!) 131/96   Pulse: 59   Resp: 14   Temp: 97.8 °F (36.6 °C)   SpO2: 100%     Vitals within 20% of pt's admission vitals as per ROD SCORE    SpO2: 100 % on room air      Intake/Output Summary (Last 24 hours) at 2/5/2024 1206  Last data filed at 2/5/2024 1155  Gross per 24 hour   Intake 1420 ml   Output 925 ml   Net 495 ml       Pain assessment:  present - adequately treated    Pain Level: 4    Patient was assessed for alterations to skin integrity. There were not alterations observed.    Is patient incontinent: no    Handoff report given at bedside to JUN Agarwal.  No family here with patient, patient updated on status.      2/5/2024 12:06 PM  
Patient educated on discharge instructions as well as new medications use, dosage, administration and possible side effects.  Patient verified knowledge. IV removed without difficulty and dry dressing in place. Telemetry monitor removed and returned to CMU. Pt left facility in stable condition to Home with all of their personal belongings.       Any Dubois RN    
Patient going down for cysto at this time. Pt stable.    Any Dubois RN    
Patient is resting in bed at the bedside. IV infusing. Patient instructed to use call light for any needs that may arise between now and surgery time, verbalized understanding. Denies needs at present with call light in reach.   
Patient refusing bed alarm. Educated on importance of alarm. Still refusing. Charge RN made aware.    Any Dubois RN    
Pt opening eyes and oral airway removed.   
Pt received from OR to PACU # 2 via cart.     Post: Procedure(s):  CYSTOSCOPY LEFT STENT INSERTION  POSSIBLE URETEROSCOPY WITH X-RAY AND LASER     Report received from crna and or RN.    Per report pt did well throughout the surgery. String attached to penis with scant amount of bloody drainage.    Respirations 15.  Pt is not fully wakeful from sedation. Pulse ox  97% on 3LNC and oral airway.      Attached to PACU monitoring system. Alarms and parameters set.    No signs of pain or nausea noted      
nephroureterolithiasis on the right side. URINARY BLADDER: Normal. REPRODUCTIVE ORGANS: No associated masses. BOWEL: Normal diameter, nonobstructed.  Appendix without thickening or enlargement. LYMPH NODES: No abnormally enlarged nodes. PERITONEUM/RETROPERITONEUM: No ascites or free air. VESSELS: Aorta and IVC without significant abnormality.  Splenic vein, SMV, PV and hepatic veins demonstrate enhancement. ABDOMINAL WALL: Normal. BONES: No significant abnormality. OTHER FINDINGS: None.     5 x 3 mm obstructing calculus at the left ureterovesicular junction with upstream hydroureteronephrosis. There is also altered enhancement in the left kidney, which is mildly enlarged.      CBC:   Recent Labs     02/04/24  0817   WBC 9.6   HGB 15.6        BMP:    Recent Labs     02/04/24 0817      K 4.1      CO2 24   BUN 14   CREATININE 1.2   GLUCOSE 96     Hepatic:   Recent Labs     02/04/24 0817   AST 34   ALT 37   BILITOT 0.7   ALKPHOS 82     Lipids: No results found for: \"CHOL\", \"HDL\", \"TRIG\"  Hemoglobin A1C:   Lab Results   Component Value Date/Time    LABA1C 5.2 08/16/2023 12:07 PM     TSH: No results found for: \"TSH\"  Troponin: No results found for: \"TROPONINT\"  Lactic Acid: No results for input(s): \"LACTA\" in the last 72 hours.  BNP: No results for input(s): \"PROBNP\" in the last 72 hours.  UA:  Lab Results   Component Value Date/Time    NITRU NEGATIVE 02/04/2024 08:17 AM    COLORU Yellow 02/04/2024 08:17 AM    PHUR 5.5 02/04/2024 08:17 AM    WBCUA 6 TO 9 02/04/2024 08:17 AM    RBCUA 6 TO 9 02/04/2024 08:17 AM    MUCUS PRESENT 02/04/2024 08:17 AM    SPECGRAV >1.030 02/04/2024 08:17 AM    LEUKOCYTESUR NEGATIVE 02/04/2024 08:17 AM    UROBILINOGEN 0.2 02/04/2024 08:17 AM    BILIRUBINUR SMALL 02/04/2024 08:17 AM    GLUCOSEU NEGATIVE 02/04/2024 08:17 AM    KETUA NEGATIVE 02/04/2024 08:17 AM         Cultures:  Urine Cultures: No results found for: \"LABURIN\"  Blood Cultures: No results found for: \"BC\"  No

## 2024-02-05 NOTE — BRIEF OP NOTE
Brief Postoperative Note      Patient: Ángel Rod  YOB: 1975  MRN: [id number]    Date of Procedure: 2/5/2024    Pre-Op Diagnosis Codes:     * Left ureteral stone [N20.1]    Post-Op Diagnosis: Same       Procedure(s):  CYSTOSCOPY LEFT STENT INSERTION  POSSIBLE URETEROSCOPY WITH X-RAY AND LASER    Surgeon(s):  Devon Corbin MD    Assistant:  Surgical Assistant: Becky Khan    Anesthesia: Choice    Estimated Blood Loss (mL): Minimal    Complications: None    Specimens:   ID Type Source Tests Collected by Time Destination   A : LEFT URETERAL STONE FOR ANALYSIS Tissue Kidney SURGICAL PATHOLOGY Devon Corbin MD 2/5/2024 1110        Implants:  * No implants in log *      Drains: * No LDAs found *    Findings: stone extracted  Stent placed on string    Plan  Remove stent this Thursday at home  See us in 6 weeks for stone prevention talk  Can discharge the patient home from a urology standpoint        Electronically signed by Devon Corbin MD on 2/5/2024 at 11:15 AM

## 2024-02-05 NOTE — FLOWSHEET NOTE
02/05/24 1224   Vital Signs   Temp 97.7 °F (36.5 °C)   Temp Source Oral   Pulse 58   Heart Rate Source Monitor   Respirations 16   BP (!) 134/92   MAP (Calculated) 106   MAP (mmHg) 104   BP Location Right upper arm   Patient Position Supine   Pain Assessment   Pain Assessment 0-10   Pain Level 2   Pain Location Penis   Pain Descriptors Aching;Discomfort   Pain Type Surgical pain   Oxygen Therapy   SpO2 99 %   O2 Device None (Room air)   O2 Flow Rate (L/min) 0 L/min       Pt returned from cysto. O/A x4. Ambulating in room. Pt denies any other care needs at this time. No s/s of distress. Reassessment completed. Pt stable.    Any Dubois RN

## 2024-02-05 NOTE — ANESTHESIA PRE PROCEDURE
Department of Anesthesiology  Preprocedure Note       Name:  Ángel Rod   Age:  48 y.o.  :  1975                                          MRN:  [id number]         Date:  2024      Surgeon: Surgeon(s):  Devon Corbin MD    Procedure: Procedure(s):  CYSTOSCOPY LEFT STENT INSERTION  POSSIBLE URETEROSCOPY WITH X-RAY AND LASER    Medications prior to admission:   Prior to Admission medications    Medication Sig Start Date End Date Taking? Authorizing Provider   cetirizine (ZYRTEC) 10 MG tablet Take 1 tablet by mouth daily  Patient not taking: Reported on 2024   Cady Maguire, APRN - CNP       Current medications:    Current Facility-Administered Medications   Medication Dose Route Frequency Provider Last Rate Last Admin    0.9 % sodium chloride infusion   IntraVENous Continuous Rolo Javier  mL/hr at 24 0707 New Bag at 24 0707    morphine (PF) injection 2 mg  2 mg IntraVENous Q4H PRN Rolo Javier MD        ondansetron (ZOFRAN-ODT) disintegrating tablet 4 mg  4 mg Oral Once Rolo Javier MD        sennosides-docusate sodium (SENOKOT-S) 8.6-50 MG tablet 1 tablet  1 tablet Oral BID Rolo Javier MD   1 tablet at 24 2101    cefTRIAXone (ROCEPHIN) 2,000 mg in sodium chloride 0.9 % 50 mL IVPB (mini-bag)  2,000 mg IntraVENous Q24H Rolo Javier MD   Stopped at 24 2130    acetaminophen (TYLENOL) tablet 650 mg  650 mg Oral Q4H PRN Rolo Javier MD        oxyCODONE (ROXICODONE) immediate release tablet 5 mg  5 mg Oral Q4H PRN Rolo Javier MD           Allergies:  No Known Allergies    Problem List:    Patient Active Problem List   Diagnosis Code    Renal colic N23       Past Medical History:        Diagnosis Date    Hyperlipidemia        Past Surgical History:  No past surgical history on file.    Social History:    Social History     Tobacco Use    Smoking status: Every Day     Current packs/day: 0.50     Types: Cigarettes     Passive exposure: Never

## 2024-02-05 NOTE — OP NOTE
Operative Note      Patient: Ángel oRd  YOB: 1975  MRN: [id number]    Date of Procedure: 2/5/2024    Pre-Op Diagnosis Codes:     * Left ureteral stone [N20.1]    Post-Op Diagnosis: Same       Procedure(s):  1. Cystoscopy  2. LEFT semi-rigid ureteroscopy  3. LEFT laser lithotripsy  4. LEFT basket extraction of stones  5. LEFT retrograde pyelogram with fluoroscopic interpretation less than 1hr  6. LEFT 4.8X26 ureteral stent placement, with string attached    Surgeon(s):  Devon Corbin MD    Assistant:   Surgical Assistant: Becky Khan    Anesthesia: Choice    Estimated Blood Loss (mL): Minimal    Complications: None    Specimens:   ID Type Source Tests Collected by Time Destination   A : LEFT URETERAL STONE FOR ANALYSIS Tissue Kidney SURGICAL PATHOLOGY Devon Corbin MD 2/5/2024 1110        Implants:  Implant Name Type Inv. Item Serial No.  Lot No. LRB No. Used Action   STENT URET L26CM OD4.8FR HYDR+ DBL PGTL FLX TAPR TIP THRD - [id number]  STENT URET L26CM OD4.8FR HYDR+ DBL PGTL FLX TAPR TIP THRD  AC [facility] UROLOGY-WD [id number] Left 1 Implanted         Drains:   Ureteral Drain/Stent 02/05/24 Left Ureter (Active)       Findings: Stone seen in the distal left ureter - lasered into smaller pieces - grasped with a basket and removed - stent placed on a string     Procedure:  After obtaining informed consent, the patient was brought to the operating room and placed supine on the operating room table.  After adequate anesthesia, she was then repositioned in the dorsal lithotomy position and prepped & draped in the standard surgical fashion. I began the case by doing rigid cystoscopy with a 21-Yi sheath and a 30-degree lens.  The bladder was free of masses or lesions. I was able to advance a Sensor wire up the LEFT ureter past the stone. The wire curled appropriately within the upper pole of kidney.       The wire was secured to the drape as my safety wire. I then inserted the

## 2024-02-05 NOTE — DISCHARGE SUMMARY
V2.0  Discharge Summary    Name:  Ángel Rod /Age/Sex: 1975 (48 y.o. male)   Admit Date: 2024  Discharge Date: 24    MRN & CSN:  [id number] & [id number] Encounter Date and Time 24    Attending:  Rolo Javier MD Discharging Provider: Jay Javier MD     Hospital Course:     Brief HPI & Summary of Hospital Course:   Ángel Rod is a 48 y.o. male with medical history including hyperlipidemia presented emergency room with complaint of left flank and left groin pain. Please review H&P and epic chart for full details. In summary, during the course of hospitalization patient was found to have following problems including  Acute left renal colic pain due to #2: resolved  Nephrolithiasis with obstructive uropathy: 5 mm x 3 mm obstructing calculus at L ureterovesicular junction with left hydronephrosis. S/p cystoscopy, L ureteric stent placement on 24 by Urology team  Abnormal UA: Urine cx with no growth.  Intermittent dizziness/vertigo with movement of head for last 2 months, unclear etiology, concern for BPV, rule out central etiology. No ringing in ear. MRI brain with no acute findings. Currently asymptomatic. Advised to follow up with PCP.  Hx of HLD - not on meds  On day of discharge patient was feeling much better, was eating, voiding, stooling and ambulating without difficulty, felt had reached maximum benefit of this hospitalization, so was discharged in stable. condition.      Consults this admission:  IP CONSULT TO UROLOGY      Discharge Diagnosis:   Renal colic    Dizziness    Discharge Instructions:   Diet: regular diet   Activity: As tolerated  Advised medication compliance and regular follow up as outpt.  If you have any problems after going home please call your family doctor or go to the nearest emergency room.  Call PCP or go to nearest ER if you develop any of following symptoms or any worsening symptoms. Fever, chills, chest pain, cough, hemoptysis, shortness of breath,

## 2024-02-05 NOTE — PLAN OF CARE
Problem: Discharge Planning  Goal: Discharge to home or other facility with appropriate resources  2/5/2024 0836 by Any Dubois, RN  [facility]  2/5/2024 0836 by Any Dubois, RN  Outcome: /[facility] Progress[facility]     Problem: Pain  Goal: Verbalizes/displays adequate comfort level or baseline comfort level  2/5/2024 0836 by Any Dubois, RN  Outcome: /[facility] Progressing  2/5/2024 0836 by Any Dubois, RN  Outcome: /[facility] Progressing  2/5/2024 0150 by Laura Adams, RN  Outcome: Progressing  Flowsheets (Taken 2/4/2024 2047)  Verbalizes/displays adequate comfort level or baseline comfort level: Encourage patient to monitor pain and request assistance     Problem: Safety - Adult  Goal: Free from fall injury  Outcome: /[facility] Progressing

## 2024-02-05 NOTE — PLAN OF CARE
Problem: Pain  Goal: Verbalizes/displays adequate comfort level or baseline comfort level  2/5/2024 0150 by Laura Adams, RN  Outcome: Progressing  Flowsheets (Taken 2/4/2024 2047)  Verbalizes/displays adequate comfort level or baseline comfort level: Encourage patient to monitor pain and request assistance  2/4/2024 1306 by Charly Benitez, RN  Outcome: Progressing  Flowsheets (Taken 2/4/2024 1306)  Verbalizes/displays adequate comfort level or baseline comfort level:   Encourage patient to monitor pain and request assistance   Administer analgesics based on type and severity of pain and evaluate response   Consider cultural and social influences on pain and pain management   Assess pain using appropriate pain scale   Implement non-pharmacological measures as appropriate and evaluate response   Notify Licensed Independent Practitioner if interventions unsuccessful or patient reports new pain      oriented to person, place and time

## 2024-02-06 LAB — SURGICAL PATHOLOGY REPORT: NORMAL

## 2024-02-06 NOTE — ANESTHESIA POSTPROCEDURE EVALUATION
Department of Anesthesiology  Postprocedure Note    Patient: Ángel Rod  MRN: [id number]  YOB: 1975  Date of evaluation: 2/6/2024    Procedure Summary       Date: 02/05/24 Room / Location: [facility]    Anesthesia Start: 1029 Anesthesia Stop: 1124    Procedures:       CYSTOSCOPY LEFT STENT INSERTION (Left)      URETEROSCOPY WITH X-RAY AND LASER (Left) Diagnosis:       Left ureteral stone      (Left ureteral stone [N20.1])    Surgeons: Devon Coribn MD Responsible Provider: Mateo Burger MD    Anesthesia Type: general ASA Status: 2            Anesthesia Type: No value filed.    Chuy Phase I: Chuy Score: 10    Chuy Phase II:      Anesthesia Post Evaluation    Patient location during evaluation: PACU  Patient participation: complete - patient participated  Level of consciousness: awake and alert  Pain score: 0  Airway patency: patent  Nausea & Vomiting: no nausea and no vomiting  Cardiovascular status: blood pressure returned to baseline  Respiratory status: acceptable  Hydration status: euvolemic  Pain management: adequate    No notable events documented.

## 2024-08-04 LAB
STONE COMPOSITION: NORMAL
STONE DESCRIPTION: NORMAL
STONE MASS: 37 MG

## (undated) DEVICE — TOWEL,OR,DSP,ST,BLUE,STD,4/PK,20PK/CS: Brand: MEDLINE

## (undated) DEVICE — GLOVE SURG SZ 65 THK91MIL LTX FREE SYN POLYISOPRENE

## (undated) DEVICE — GUIDEWIRE ENDOSCP L150CM DIA0.035IN TIP 3CM PTFE NIT

## (undated) DEVICE — SYRINGE MED 10ML LUERLOCK TIP W/O SFTY DISP

## (undated) DEVICE — GOWN SIRUS NONREIN LG W/TWL: Brand: MEDLINE INDUSTRIES, INC.

## (undated) DEVICE — MASTISOL ADHESIVE LIQ 2/3ML

## (undated) DEVICE — CONTAINER,SPECIMEN,OR STERILE,4OZ: Brand: MEDLINE

## (undated) DEVICE — NITINOL STONE RETRIEVAL BASKET: Brand: ZERO TIP

## (undated) DEVICE — STRIP SKIN CLOSURE 4X0.25 IN REINF N WOVEN WHT STERI STRP

## (undated) DEVICE — GLOVE ORANGE PI 7   MSG9070

## (undated) DEVICE — BOWL MED L 32OZ PLAS W/ MOLD GRAD EZ OPN PEEL PCH

## (undated) DEVICE — CYSTO: Brand: MEDLINE INDUSTRIES, INC.

## (undated) DEVICE — GLOVE ORTHO 8   MSG9480

## (undated) DEVICE — FLEXIVA  PULSE  AND  FLEXIVA  PULSE  TRACTIP  LASER  FIBERS  ARE  HIGH  POWER  SINGLE-USE FIBER: Brand: FLEXIVA PULSE

## (undated) DEVICE — OPEN-END FLEXI-TIP URETERAL CATHETER: Brand: FLEXI-TIP